# Patient Record
Sex: MALE | Race: WHITE
[De-identification: names, ages, dates, MRNs, and addresses within clinical notes are randomized per-mention and may not be internally consistent; named-entity substitution may affect disease eponyms.]

---

## 2018-01-11 ENCOUNTER — HOSPITAL ENCOUNTER (EMERGENCY)
Dept: HOSPITAL 36 - ER | Age: 43
Discharge: HOME | End: 2018-01-11
Payer: SELF-PAY

## 2018-01-11 DIAGNOSIS — F17.200: ICD-10-CM

## 2018-01-11 DIAGNOSIS — F10.120: Primary | ICD-10-CM

## 2018-01-11 LAB
ALBUMIN SERPL-MCNC: 4.4 GM/DL (ref 4.2–5.5)
ALBUMIN/GLOB SERPL: 1.6 {RATIO} (ref 1–1.8)
ALP SERPL-CCNC: 51 U/L (ref 34–104)
ALT SERPL-CCNC: 11 U/L (ref 7–52)
AMPHET UR-MCNC: POSITIVE NG/ML
ANION GAP SERPL CALC-SCNC: 12.5 MMOL/L (ref 7–16)
AST SERPL-CCNC: 15 U/L (ref 13–39)
BARBITURATES UR-MCNC: NEGATIVE UG/ML
BASOPHILS # BLD AUTO: 0 TH/CUMM (ref 0–0.2)
BASOPHILS NFR BLD AUTO: 0.3 % (ref 0–2)
BENZODIAZEPINES PNL UR: NEGATIVE
BILIRUB SERPL-MCNC: 0.4 MG/DL (ref 0.3–1)
BUN SERPL-MCNC: 12 MG/DL (ref 7–25)
CALCIUM SERPL-MCNC: 9 MG/DL (ref 8.6–10.3)
CANNABINOIDS SERPL QL CFM: NEGATIVE
CHLORIDE SERPL-SCNC: 104 MEQ/L (ref 98–107)
CHOLEST SERPL-MCNC: 214 MG/DL (ref ?–200)
CK SERPL-CCNC: 203 U/L (ref 30–223)
CO2 SERPL-SCNC: 24.1 MEQ/L (ref 21–31)
COCAINE METAB.OTHER UR-MCNC: NEGATIVE NG/ML
CREAT SERPL-MCNC: 0.6 MG/DL (ref 0.7–1.3)
EOSINOPHIL # BLD AUTO: 0.1 TH/CMM (ref 0.1–0.4)
EOSINOPHIL NFR BLD AUTO: 2.1 % (ref 0–5)
ERYTHROCYTE [DISTWIDTH] IN BLOOD BY AUTOMATED COUNT: 12.1 % (ref 11.5–20)
GLOBULIN SER-MCNC: 2.7 GM/DL
GLUCOSE SERPL-MCNC: 139 MG/DL (ref 70–105)
HCT VFR BLD CALC: 39.9 % (ref 41–60)
HDLC SERPL-MCNC: 106 MG/DL (ref 23–92)
HGB BLD-MCNC: 13.8 GM/DL (ref 12–16)
INR PPP: 0.92 (ref 0.5–1.4)
LYMPHOCYTE AB SER FC-ACNC: 3.3 TH/CMM (ref 1.5–3)
LYMPHOCYTES NFR BLD AUTO: 50.6 % (ref 20–50)
MCH RBC QN AUTO: 31.6 PG (ref 26–30)
MCHC RBC AUTO-ENTMCNC: 34.5 PG (ref 28–36)
MCV RBC AUTO: 91.4 FL (ref 80–99)
METHADONE UR CFM-MCNC: NEGATIVE NG/ML
METHAMPHET UR QL: POSITIVE
MONOCYTES # BLD AUTO: 0.4 TH/CMM (ref 0.3–1)
MONOCYTES NFR BLD AUTO: 6.5 % (ref 2–10)
NEUTROPHILS # BLD: 2.6 TH/CMM (ref 1.8–8)
NEUTROPHILS NFR BLD AUTO: 40.5 % (ref 40–80)
OPIATES UR QL: NEGATIVE
PCP UR-MCNC: NEGATIVE UG/L
PLATELET # BLD: 240 TH/CMM (ref 150–400)
PMV BLD AUTO: 8.1 FL
POTASSIUM SERPL-SCNC: 3.6 MEQ/L (ref 3.5–5.1)
PROTHROMBIN TIME: 9.6 SECONDS (ref 9.5–11.5)
RBC # BLD AUTO: 4.36 MIL/CMM (ref 4.3–5.7)
SODIUM SERPL-SCNC: 137 MEQ/L (ref 136–145)
TRICYCLICS UR QL: NEGATIVE
TRIGL SERPL-MCNC: 164 MG/DL (ref ?–150)
WBC # BLD AUTO: 6.4 TH/CMM (ref 4.8–10.8)

## 2018-01-11 NOTE — DIAGNOSTIC IMAGING REPORT
CHEST X-RAY: AP view



INDICATION: pain



COMPARISON: None



FINDINGS: Mild chronic lung changes are noted.  There is no focal

consolidation or pleural effusions The heart is normal in size. The

osseous structures demonstrate no acute abnormalities.



IMPRESSION: 



No focal acute pulmonary process.

## 2018-01-11 NOTE — DIAGNOSTIC IMAGING REPORT
CT scan of the brain without contrast 



History: Headache



Total DLP equals 620



CTDI equals 34.8



Axial sections were obtained from the base of the skull to the vertex.



There is a normal ventricular system size. No focal parenchymal lesions

are seen. No evidence of any mass effect or shift of midline structures.

No extra-axial masses or abnormal fluid collections.



Impression: Negative examination

## 2018-01-11 NOTE — DIAGNOSTIC IMAGING REPORT
CT scan cervical spine



History: Pain



Total DLP equals 317



CTDI equals 17.6



Axial sections were obtained through the cervical spine region.

Additional sagittal and coronal reformatted images are provided.



No focal bony lesions are seen. Specifically, no fractures are

identified. There is limited visualization of the margins of the

cervical spinal cord. No obvious extradural soft tissue abnormalities

are seen. The prevertebral soft tissues appear normal.



Impression: No acute abnormalities

## 2018-01-11 NOTE — ED PHYSICIAN CHART
ED Chief Complaint/HPI





- Patient Information


Date Seen:: 01/11/18


Time Seen:: 01:25


Chief Complaint:: ALOC


History of Present Illness:: 





pt brought to ER via EMS because pt was found on the street with loud, 

incoherent behavior, AMS, and H/As; pt denies trauma, S/T, neck pain, C/P, SOB, 

Abd. pain, A/N/V/D/C, fever, chills, or urinary s/s


Historian:: Patient, EMS


Review:: Nurse's Note Reviewed, EMS run form Reviewed





ED Review of Systems





- Review of Systems


General/Constitutional: No fever, No chills, No weight loss, No weakness, No 

diaphoresis, No edema, No loss of appetite


Skin: No skin lesions, No rash, No bruising


Head: No headache, No light-headedness


Eyes: No loss of vision, No pain, No diplopia


ENT: No earache, No nasal drainage, No sore throat, No tinnitus


Neck: No neck pain, No swelling, No thyromegaly, No stiffness, No mass noted


Cardio Vascular: No chest pain, No palpitations, No PND, No orthopnea, No edema


Pulmonary: No SOB, No cough, No sputum, No wheezing


GI: No nausea, No vomiting, No diarrhea, No pain, No melena, No hematochezia, 

No constipation, No hematemesis


G/U: No dysuria, No frequency, No hematuria, No nacturia


Musculoskeletal: No bone or joint pain, No back pain, No muscle pain


Endocrine: No polyuria, No polydipsia


Psychiatric: No prior psych history, No depression, No anxiety, No suicidal 

ideation, No homicidal ideation, No auditory hallucination, No visual 

hallucination


Hematopoietic: No bruising, No lymphadenopathy


Allergic/Immuno: No urticaria, No angioedema


Neurological: No syncope, No focal symptoms, No weakness, No paresthesia, 

Headache, No seizure, No dizziness, No confusion, No vertigo





ED Past Medical History





- Past Medical History


Obtainable: Yes


Past Medical History: No significant medical hx


Family History: HTN


Social History: Smoker, Alcohol, No Drug Use, Single


Surgical History: None


Psychiatricy History: None


Medication: Reviewed





ED Physical Exam





- Physical Examination


General/Constitutional: Awake, Well-developed, well-nourished, Alert, No 

distress, GCS 15, Non-toxic appearing, Ambulatory


Head: Atraumatic


Eyes: Lids, conjuctiva normal, PERRL, EOMI


Skin: Nl inspection, No rash, No skin lesions, No ecchymosis, Well hydrated, No 

lymphadenopathy


ENMT: External ears, nose nl, TM canals nl, Nasal exam nl, Lips, teeth, gums nl

, Oropharynx nl, Tonsils nl


Neck: Nontender, Full ROM w/o pain, No JVD, No nuchal rigidity, No bruit, No 

mass, No stridor


Respiratory: Nl effort/Exclusion, Clear to Auscultation, No Wheeze/Rhonchi/Rales


Cardio Vascular: RRR, No murmur, gallop, rubs, NL S1 S2, Carotid/Femoral/Distal 

pulses equal bilaterally


GI: No tenderness/rebounding/guarding, No organomegaly, No hernia, Normal BS's, 

Nondistended, No mass/bruits, No McBurney tenderness


: No CVA tenderness


Extremities: No tenderness or effusion, Full ROM, normal strength in all 

extremities, No edema, Normal digits & nails


Neuro/Psych: Alert/oriented, DTR's symmetric, Normal sensory exam, Normal motor 

strength, Judgement/insight normal, Mood normal, Normal gait, No focal deficits


Misc: Normal back, No paraspinal tenderness





ED Septic Shock





- .


Is Septic Shock (SBP<90, OR Lactate>4 mmol\L) present?: No